# Patient Record
Sex: FEMALE | Race: AMERICAN INDIAN OR ALASKA NATIVE
[De-identification: names, ages, dates, MRNs, and addresses within clinical notes are randomized per-mention and may not be internally consistent; named-entity substitution may affect disease eponyms.]

---

## 2020-08-07 ENCOUNTER — HOSPITAL ENCOUNTER (EMERGENCY)
Dept: HOSPITAL 5 - ED | Age: 30
Discharge: HOME | End: 2020-08-07
Payer: SELF-PAY

## 2020-08-07 VITALS — DIASTOLIC BLOOD PRESSURE: 99 MMHG | SYSTOLIC BLOOD PRESSURE: 186 MMHG

## 2020-08-07 DIAGNOSIS — M79.601: Primary | ICD-10-CM

## 2020-08-07 DIAGNOSIS — I16.0: ICD-10-CM

## 2020-08-07 LAB
ALBUMIN SERPL-MCNC: 4.3 G/DL (ref 3.9–5)
ALT SERPL-CCNC: 20 UNITS/L (ref 7–56)
BASOPHILS # (AUTO): 0.1 K/MM3 (ref 0–0.1)
BASOPHILS NFR BLD AUTO: 0.9 % (ref 0–1.8)
BUN SERPL-MCNC: 10 MG/DL (ref 7–17)
BUN/CREAT SERPL: 11 %
CALCIUM SERPL-MCNC: 9.4 MG/DL (ref 8.4–10.2)
EOSINOPHIL # BLD AUTO: 0.1 K/MM3 (ref 0–0.4)
EOSINOPHIL NFR BLD AUTO: 2.1 % (ref 0–4.3)
HCT VFR BLD CALC: 40.3 % (ref 30.3–42.9)
HEMOLYSIS INDEX: 8
HGB BLD-MCNC: 12.9 GM/DL (ref 10.1–14.3)
LYMPHOCYTES # BLD AUTO: 1.9 K/MM3 (ref 1.2–5.4)
LYMPHOCYTES NFR BLD AUTO: 31 % (ref 13.4–35)
MCHC RBC AUTO-ENTMCNC: 32 % (ref 30–34)
MCV RBC AUTO: 78 FL (ref 79–97)
MONOCYTES # (AUTO): 0.6 K/MM3 (ref 0–0.8)
MONOCYTES % (AUTO): 10.3 % (ref 0–7.3)
PLATELET # BLD: 292 K/MM3 (ref 140–440)
RBC # BLD AUTO: 5.19 M/MM3 (ref 3.65–5.03)

## 2020-08-07 PROCEDURE — 85025 COMPLETE CBC W/AUTO DIFF WBC: CPT

## 2020-08-07 PROCEDURE — 36415 COLL VENOUS BLD VENIPUNCTURE: CPT

## 2020-08-07 PROCEDURE — 80053 COMPREHEN METABOLIC PANEL: CPT

## 2020-08-07 PROCEDURE — 71046 X-RAY EXAM CHEST 2 VIEWS: CPT

## 2020-08-07 PROCEDURE — 84484 ASSAY OF TROPONIN QUANT: CPT

## 2020-08-07 NOTE — XRAY REPORT
CHEST 2 VIEWS 



INDICATION: 

Hypertension urgency.



COMPARISON: 

None.



FINDINGS:

Support devices: None.



Heart: Within normal limits. 

Lungs/Pleura: No acute air space or interstitial disease.   No significant pleural effusion. 



IMPRESSION:  No acute findings.



Signer Name: Luis Rose MD 

Signed: 8/7/2020 7:44 PM

Workstation Name: [id number]

## 2020-08-07 NOTE — EMERGENCY DEPARTMENT REPORT
ED General Adult HPI





- General


Chief complaint: Extremity Injury, Upper


Stated complaint: RIGHT ARM PAIN


Time Seen by Provider: 08/07/20 22:28


Source: patient


Mode of arrival: Ambulatory


Limitations: No Limitations





- History of Present Illness


Initial comments: 





Patient is a 29-year-old F American female is presenting with some right upper 

arm pain for the past week.  She states this is intermittent.  There is a achy 

sensation that is worse when she is driving where she make certain movements or 

is lying down.  Patient denies any chest pain shortness of breath swelling to 

the arm or trauma.  Patient states she does know that her blood pressure is e

levated as she ran out of her medications approximately 3 days ago.





- Related Data


                                  Previous Rx's











 Medication  Instructions  Recorded  Last Taken  Type


 


Ibuprofen [Motrin 600 MG tab] 600 mg PO Q8H PRN #20 tablet 08/07/20 Unknown Rx


 


cloNIDine [Catapres] 0.1 mg PO BID #60 tablet 08/07/20 Unknown Rx


 


lisinopriL [Zestril TAB] 40 mg PO QDAY #30 tablet 08/07/20 Unknown Rx











                                    Allergies











Allergy/AdvReac Type Severity Reaction Status Date / Time


 


No Known Allergies Allergy   Verified 08/07/20 18:03














ED Review of Systems


ROS: 


Stated complaint: RIGHT ARM PAIN


Other details as noted in HPI





Comment: All other systems reviewed and negative





ED Past Medical Hx





- Past Medical History


Previous Medical History?: No


Hx Hypertension: Yes





- Surgical History


Past Surgical History?: No





- Social History


Smoking Status: Never Smoker


Substance Use Type: None





- Medications


Home Medications: 


                                Home Medications











 Medication  Instructions  Recorded  Confirmed  Last Taken  Type


 


Ibuprofen [Motrin 600 MG tab] 600 mg PO Q8H PRN #20 tablet 08/07/20  Unknown Rx


 


cloNIDine [Catapres] 0.1 mg PO BID #60 tablet 08/07/20  Unknown Rx


 


lisinopriL [Zestril TAB] 40 mg PO QDAY #30 tablet 08/07/20  Unknown Rx














ED Physical Exam





- General


Limitations: No Limitations


General appearance: alert, in no apparent distress





- Head


Head exam: Present: atraumatic, normocephalic





- Eye


Eye exam: Present: normal appearance, PERRL, EOMI





- ENT


ENT exam: Present: mucous membranes moist





- Neck


Neck exam: Present: normal inspection





- Respiratory


Respiratory exam: Present: normal lung sounds bilaterally.  Absent: respiratory 

distress, wheezes, rales, rhonchi





- Cardiovascular


Cardiovascular Exam: Present: regular rate, normal rhythm, normal heart sounds. 

Absent: systolic murmur, diastolic murmur, rubs, gallop





- GI/Abdominal


GI/Abdominal exam: Present: soft, normal bowel sounds.  Absent: distended, 

tenderness, guarding, rebound





- Extremities Exam


Extremities exam: Present: normal inspection, other (Right upper extremity 

appears within normal limits).  Absent: tenderness, joint swelling





- Back Exam


Back exam: Present: normal inspection





- Neurological Exam


Neurological exam: Present: alert, oriented X3





- Psychiatric


Psychiatric exam: Present: normal affect, normal mood





- Skin


Skin exam: Present: warm, dry, intact, normal color.  Absent: rash





ED Course





                                   Vital Signs











  08/07/20





  18:05


 


Temperature 98.7 F


 


Pulse Rate 109 H


 


Respiratory 18





Rate 


 


Blood Pressure 213/134


 


O2 Sat by Pulse 98





Oximetry 














ED Medical Decision Making





- Lab Data


Result diagrams: 


                                 08/07/20 18:51





                                 08/07/20 18:51








                                   Lab Results











  08/07/20 08/07/20 08/07/20 Range/Units





  18:51 18:51 21:01 


 


WBC  6.3    (4.5-11.0)  K/mm3


 


RBC  5.19 H    (3.65-5.03)  M/mm3


 


Hgb  12.9    (10.1-14.3)  gm/dl


 


Hct  40.3    (30.3-42.9)  %


 


MCV  78 L    (79-97)  fl


 


MCH  25 L    (28-32)  pg


 


MCHC  32    (30-34)  %


 


RDW  15.7 H    (13.2-15.2)  %


 


Plt Count  292    (140-440)  K/mm3


 


Lymph % (Auto)  31.0    (13.4-35.0)  %


 


Mono % (Auto)  10.3 H    (0.0-7.3)  %


 


Eos % (Auto)  2.1    (0.0-4.3)  %


 


Baso % (Auto)  0.9    (0.0-1.8)  %


 


Lymph #  1.9    (1.2-5.4)  K/mm3


 


Mono #  0.6    (0.0-0.8)  K/mm3


 


Eos #  0.1    (0.0-0.4)  K/mm3


 


Baso #  0.1    (0.0-0.1)  K/mm3


 


Seg Neutrophils %  55.7    (40.0-70.0)  %


 


Seg Neutrophils #  3.5    (1.8-7.7)  K/mm3


 


Sodium   141   (137-145)  mmol/L


 


Potassium   3.5 L   (3.6-5.0)  mmol/L


 


Chloride   104.7   ()  mmol/L


 


Carbon Dioxide   23   (22-30)  mmol/L


 


Anion Gap   17   mmol/L


 


BUN   10   (7-17)  mg/dL


 


Creatinine   0.9   (0.6-1.2)  mg/dL


 


Estimated GFR   > 60   ml/min


 


BUN/Creatinine Ratio   11   %


 


Glucose   101 H   ()  mg/dL


 


Calcium   9.4   (8.4-10.2)  mg/dL


 


Total Bilirubin   0.20   (0.1-1.2)  mg/dL


 


AST   19   (5-40)  units/L


 


ALT   20   (7-56)  units/L


 


Alkaline Phosphatase   64   ()  units/L


 


Troponin T   < 0.010  < 0.010  (0.00-0.029)  ng/mL


 


Total Protein   7.5   (6.3-8.2)  g/dL


 


Albumin   4.3   (3.9-5)  g/dL


 


Albumin/Globulin Ratio   1.3   %














- Medical Decision Making





Patient is arm discomfort likely musculoskeletal.  Troponin is negative x2.  

Blood pressure without medication is 180 systolic she was started back on her Ca

tapres.  Patient be given medication refills.


Critical care attestation.: 


If time is entered above; I have spent that time in minutes in the direct care 

of this critically ill patient, excluding procedure time.








ED Disposition


Clinical Impression: 


 Hypertensive urgency, Medical non-compliance





Musculoskeletal arm pain


Qualifiers:


 Laterality: right Qualified Code(s): M79.601 - Pain in right arm





Disposition: DC-01 TO HOME OR SELFCARE


Is pt being admited?: No


Does the pt Need Aspirin: No


Condition: Stable


Instructions:  Hypertension (ED), Musculoskeletal Pain (ED)


Prescriptions: 


cloNIDine [Catapres] 0.1 mg PO BID #60 tablet


lisinopriL [Zestril TAB] 40 mg PO QDAY #30 tablet


Referrals: 


PRIMARY CARE,MD [Primary Care Provider] - 3-5 Days


Time of Disposition: 23:09